# Patient Record
Sex: FEMALE | Race: WHITE | NOT HISPANIC OR LATINO | Employment: FULL TIME | URBAN - METROPOLITAN AREA
[De-identification: names, ages, dates, MRNs, and addresses within clinical notes are randomized per-mention and may not be internally consistent; named-entity substitution may affect disease eponyms.]

---

## 2017-06-02 ENCOUNTER — ALLSCRIPTS OFFICE VISIT (OUTPATIENT)
Dept: OTHER | Facility: OTHER | Age: 22
End: 2017-06-02

## 2017-07-26 ENCOUNTER — GENERIC CONVERSION - ENCOUNTER (OUTPATIENT)
Dept: OTHER | Facility: OTHER | Age: 22
End: 2017-07-26

## 2018-01-13 NOTE — PROGRESS NOTES
Assessment    1  OCD (obsessive compulsive disorder) (300 3) (F42 9)   2  Anxiety (300 00) (F41 9)   3  Ganglion cyst of foot (727 43) (M67 479)   4  External hemorrhoids (455 3) (K64 4)    Plan  Anxiety    · ALPRAZolam 0 25 MG Oral Tablet; TAKE 1 TABLET Daily PRN anxiety    Discussion/Summary    OCD obsessive-compulsive disorder: Continue sertraline 100 mg daily  Episodic premenstrual anxiety with panic symptoms: Trial of alprazolam 0 25 mg as needed  Call as needed  Ganglion cyst of right foot: Plan is opinion from Dr Fausto Berry about removal     External hemorrhoids: Continue her current bowel regimen, and symptoms of thrombosis were reviewed and she would then need to go to emergency room  She was referred to colorectal surgery  Chief Complaint  ANNUAL PE      History of Present Illness  HPI: Leonela Ibarra is here today to discuss several issues  She is here today with her mother  Lazaro was  last week and is working as an emergency room nurse and attending school, nurse practitioner  She is thinking about switching to the [facility] [employer] [facility] [employer] when she graduates  I encouraged her to do so  She has a long-standing history of endogenous OCD which has responded well to sertraline 100 mg daily  This will be continued at this time  We discussed that if she is thinking about becoming pregnant that she would need to stop SSRI medications such as sertraline entertainment talked to me ahead of planning pregnancy  Lazaro also has a ganglion cyst of the dorsum of the right foot with occasional minor discomfort especially when on her feet all day as an emergency room nurse  She is looking for a referral for possible removal and I referred her to Dr Horacio Patel  Lazaro also has developed symptomatic external hemorrhoids, and tends to be a bit constipated  She moves her bowels once a day but has to strain  She had some minor bleeding recently was minor pain   She started taking senna-based stool softener, drinking more fluids and is using preparation H  She still has some discomfort  There are no symptoms suggestive of thrombosis  She is interested in considering elective hemorrhoidectomy and was referred to her mother's group, which includes Dr Malu Fischer  Also, clear notices that she has episodes of panic symptoms around the time of her cycle without external trigger  She would like to try alprazolam no more often than once a month as this tends to occur specific time just before her cycle and causes significant symptomatology  Active Problems    1  OCD (obsessive compulsive disorder) (300 3) (F42 9)   2  Denied: History of Patient Education - Self-Examination Of Breasts    Past Medical History    · Acute upper respiratory infection (465 9) (J06 9)   · History of Denial Of Any Significant Medical History   · History of Never Pregnant    Surgical History    · History of Oral Surgery Tooth Extraction   · Denied: History of Patient Education - Self-Examination Of Breasts    Family History  Mother    · Family history of Hypertension (V17 49)  Father    · Family history of Hypertension (V17 49)  Paternal Grandmother    · Family history of Colon Cancer (V16 0)   · Family history of Diabetes Mellitus (V18 0)  Maternal Grandfather    · Family history of Diabetes Mellitus (V18 0)  Paternal Grandfather    · Family history of Diabetes Mellitus (V18 0)    Social History    · Being A Social Drinker   · Caffeine Use   · Denied: History of Drug Use   · Marital History - Single   · Never a smoker   · Syn[employer] Affiliation [employer]   · Uses Safety Equipment - Seatbelts    Current Meds   1  Ibuprofen 200 MG Oral Tablet; Take 3 tablets every 6 hours for pain; Therapy: [id number] to (Last UJ:[id number]) Ordered   2  Sertraline HCl - 100 MG Oral Tablet; take one tablet by mouth every day; Therapy: [id number] to (Evaluate:12Jun2017)  Requested for: 13Apr2017; Last   Rx:13Apr2017 Ordered    Allergies    1   No Known Drug Allergies    Vitals   Recorded: 02Jun2017 03:12PM   Heart Rate 70   Systolic 212   Diastolic 72   Height 5 ft 7 5 in   Weight 162 lb 2 oz   BMI Calculated 25 02   BSA Calculated 1 86   O2 Saturation 99     Physical Exam    Constitutional Appears well in no distress with stable vital signs  Mood is optimal  Examination the right foot demonstrates a small ganglion cyst of the distal lateral foot, without tenderness  Overlying skin is normal       Results/Data  PHQ-2 Adult Depression Screening 02Jun2017 03:14PM User, Ahs     Test Name Result Flag Reference   PHQ-2 Adult Depression Score 0     Over the last two weeks, how often have you been bothered by any of the following problems?   Little interest or pleasure in doing things: Not at all - 0  Feeling down, depressed, or hopeless: Not at all - 0   PHQ-2 Adult Depression Screening Negative         Signatures   Electronically signed by : WESLEY Wall ; Jun 2 2017  3:43PM EST                       (Author)

## 2018-01-14 VITALS
SYSTOLIC BLOOD PRESSURE: 118 MMHG | WEIGHT: 162.13 LBS | OXYGEN SATURATION: 99 % | DIASTOLIC BLOOD PRESSURE: 72 MMHG | BODY MASS INDEX: 24.57 KG/M2 | HEART RATE: 70 BPM | HEIGHT: 68 IN

## 2018-02-10 DIAGNOSIS — F42.9 OBSESSIVE-COMPULSIVE DISORDER, UNSPECIFIED TYPE: Primary | ICD-10-CM

## 2018-02-11 RX ORDER — SERTRALINE HYDROCHLORIDE 100 MG/1
TABLET, FILM COATED ORAL
Qty: 30 TABLET | Refills: 1 | Status: SHIPPED | OUTPATIENT
Start: 2018-02-11 | End: 2018-04-09 | Stop reason: SDUPTHER

## 2018-02-19 ENCOUNTER — CLINICAL SUPPORT (OUTPATIENT)
Dept: INTERNAL MEDICINE CLINIC | Facility: CLINIC | Age: 23
End: 2018-02-19
Payer: COMMERCIAL

## 2018-02-19 DIAGNOSIS — Z91.89: ICD-10-CM

## 2018-02-19 DIAGNOSIS — Z78.9 H/O FOREIGN TRAVEL: Primary | ICD-10-CM

## 2018-02-19 PROCEDURE — 90715 TDAP VACCINE 7 YRS/> IM: CPT

## 2018-02-19 PROCEDURE — 90471 IMMUNIZATION ADMIN: CPT

## 2018-04-09 DIAGNOSIS — F42.9 OBSESSIVE-COMPULSIVE DISORDER, UNSPECIFIED TYPE: ICD-10-CM

## 2018-04-10 DIAGNOSIS — F41.9 ANXIETY: Primary | ICD-10-CM

## 2018-04-10 RX ORDER — SERTRALINE HYDROCHLORIDE 100 MG/1
TABLET, FILM COATED ORAL
Qty: 30 TABLET | Refills: 1 | Status: SHIPPED | OUTPATIENT
Start: 2018-04-10 | End: 2018-06-11 | Stop reason: SDUPTHER

## 2018-04-10 RX ORDER — ALPRAZOLAM 0.25 MG/1
1 TABLET ORAL DAILY PRN
COMMUNITY
Start: 2017-06-02 | End: 2019-03-15 | Stop reason: SDUPTHER

## 2018-04-10 RX ORDER — ALPRAZOLAM 0.25 MG/1
0.25 TABLET ORAL DAILY PRN
Qty: 15 TABLET | Refills: 1 | OUTPATIENT
Start: 2018-04-10

## 2018-04-10 NOTE — TELEPHONE ENCOUNTER
J, here is another rx that EPIC has set up as an electronic renewal and the process does not work as the final step of notification is not connected  Please ask Marcia to assist  Thanks

## 2018-06-11 DIAGNOSIS — F42.9 OBSESSIVE-COMPULSIVE DISORDER, UNSPECIFIED TYPE: ICD-10-CM

## 2018-06-11 RX ORDER — SERTRALINE HYDROCHLORIDE 100 MG/1
TABLET, FILM COATED ORAL
Qty: 30 TABLET | Refills: 1 | Status: SHIPPED | OUTPATIENT
Start: 2018-06-11 | End: 2018-07-21 | Stop reason: SDUPTHER

## 2018-07-21 DIAGNOSIS — F42.9 OBSESSIVE-COMPULSIVE DISORDER, UNSPECIFIED TYPE: ICD-10-CM

## 2018-07-21 RX ORDER — SERTRALINE HYDROCHLORIDE 100 MG/1
TABLET, FILM COATED ORAL
Qty: 30 TABLET | Refills: 1 | Status: SHIPPED | OUTPATIENT
Start: 2018-07-21 | End: 2018-10-02 | Stop reason: SDUPTHER

## 2018-09-10 ENCOUNTER — OFFICE VISIT (OUTPATIENT)
Dept: INTERNAL MEDICINE CLINIC | Facility: CLINIC | Age: 23
End: 2018-09-10
Payer: COMMERCIAL

## 2018-09-10 VITALS
OXYGEN SATURATION: 97 % | WEIGHT: 174 LBS | HEART RATE: 62 BPM | BODY MASS INDEX: 26.37 KG/M2 | HEIGHT: 68 IN | DIASTOLIC BLOOD PRESSURE: 68 MMHG | SYSTOLIC BLOOD PRESSURE: 112 MMHG

## 2018-09-10 DIAGNOSIS — Z00.00 WELL ADULT EXAM: Primary | ICD-10-CM

## 2018-09-10 PROBLEM — K64.4 EXTERNAL HEMORRHOIDS: Status: ACTIVE | Noted: 2017-06-02

## 2018-09-10 PROBLEM — M67.479 GANGLION CYST OF FOOT: Status: ACTIVE | Noted: 2017-06-02

## 2018-09-10 PROBLEM — F41.9 ANXIETY: Status: ACTIVE | Noted: 2017-06-02

## 2018-09-10 PROCEDURE — 99395 PREV VISIT EST AGE 18-39: CPT | Performed by: INTERNAL MEDICINE

## 2018-09-10 RX ORDER — DIPHENOXYLATE HYDROCHLORIDE AND ATROPINE SULFATE 2.5; .025 MG/1; MG/1
1 TABLET ORAL DAILY
COMMUNITY

## 2018-09-10 RX ORDER — ALPRAZOLAM 0.25 MG/1
0.25 TABLET ORAL DAILY PRN
Qty: 30 TABLET | Refills: 0 | Status: CANCELLED | OUTPATIENT
Start: 2018-09-10

## 2018-09-10 RX ORDER — IBUPROFEN 200 MG
3 TABLET ORAL EVERY 6 HOURS
COMMUNITY
Start: 2015-11-04

## 2018-09-10 RX ORDER — DIPHENHYDRAMINE HCL 25 MG
25 CAPSULE ORAL EVERY 6 HOURS PRN
COMMUNITY

## 2018-09-10 RX ORDER — CAFFEINE 200 MG
TABLET ORAL
COMMUNITY
End: 2019-09-03 | Stop reason: ALTCHOICE

## 2018-09-10 NOTE — PROGRESS NOTES
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, immunizations are up-to-date  Sertraline continues to work very well and occasional use of alprazolam also works well for underlying OCD and anxiety  She has great support from  as well  After following up with her colorectal physician, Massiel Rosales has been moving her bowels daily, eating properly and drinking enough fluids, and has not had to use her topical cream, and has not had symptoms from her history of anal fissure  He agreed that Massiel Rosales with call between annual visits for any questions or problems  Subjective   Patient ID: Jihan Mendez is a 20 y o  female  This is an annual preventive exam     Vitals:    09/10/18 1253   BP: 112/68   Pulse: 62   SpO2: 97%     HPI   Massiel Rosales is feeling well and 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 4 year degree has a PhD nurse practitioner, and 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  Her  is very supportive  She went to [place] last year and did have traveler's diarrhea and was with an infectious disease doctor, who prescribed Cipro and she became better  She did have full immunizations prior to this trip  Other immunizations required by work as a nurse are up-to-date as well  Problems with symptomatic anal fissure with IBS C were discussed and this has responded to making sure that she uses the bathroom at work daily, as she was not habit of not using the bathroom for days when she worked several shifts in a row  She is drinking of fluids, diet is proper  She has not had any symptoms    She will follow up with Colorectal surgery as needed  Also, initial gyn visit and follow-up was established, without active problems currently  The following portions of the patient's history were reviewed and updated as appropriate: allergies, current medications, past family history, past medical history, past social history, past surgical history and problem list     Review of Systems as above    Objective   Physical Exam   Appears well, in good spirits in no distress  No depressed affect and no anxiety            Patient Active Problem List   Diagnosis    Anxiety    External hemorrhoids    Ganglion cyst of foot    OCD (obsessive compulsive disorder)     Current Outpatient Prescriptions:     ALPRAZolam (XANAX) 0 25 mg tablet, Take 1 tablet by mouth daily as needed, Disp: , Rfl:     caffeine 200 mg tablet, Take by mouth, Disp: , Rfl:     diphenhydrAMINE (BENADRYL) 25 mg capsule, Take 25 mg by mouth every 6 (six) hours as needed for itching, Disp: , Rfl:     ibuprofen (MOTRIN) 200 mg tablet, Take 3 tablets by mouth every 6 (six) hours, Disp: , Rfl:     multivitamin (THERAGRAN) TABS, Take 1 tablet by mouth daily, Disp: , Rfl:     sertraline (ZOLOFT) 100 mg tablet, TAKE ONE TABLET BY MOUTH EVERY DAY, Disp: 30 tablet, Rfl: 1

## 2018-10-02 DIAGNOSIS — F42.9 OBSESSIVE-COMPULSIVE DISORDER, UNSPECIFIED TYPE: ICD-10-CM

## 2018-10-02 RX ORDER — SERTRALINE HYDROCHLORIDE 100 MG/1
TABLET, FILM COATED ORAL
Qty: 30 TABLET | Refills: 0 | Status: SHIPPED | OUTPATIENT
Start: 2018-10-02 | End: 2018-10-05 | Stop reason: SDUPTHER

## 2018-10-05 DIAGNOSIS — F42.9 OBSESSIVE-COMPULSIVE DISORDER, UNSPECIFIED TYPE: ICD-10-CM

## 2018-10-07 RX ORDER — SERTRALINE HYDROCHLORIDE 100 MG/1
TABLET, FILM COATED ORAL
Qty: 30 TABLET | Refills: 0 | Status: SHIPPED | OUTPATIENT
Start: 2018-10-07 | End: 2018-12-03 | Stop reason: SDUPTHER

## 2018-12-03 DIAGNOSIS — F42.9 OBSESSIVE-COMPULSIVE DISORDER, UNSPECIFIED TYPE: ICD-10-CM

## 2018-12-03 RX ORDER — SERTRALINE HYDROCHLORIDE 100 MG/1
TABLET, FILM COATED ORAL
Qty: 30 TABLET | Refills: 0 | Status: SHIPPED | OUTPATIENT
Start: 2018-12-03 | End: 2019-01-01 | Stop reason: SDUPTHER

## 2019-01-01 DIAGNOSIS — F42.9 OBSESSIVE-COMPULSIVE DISORDER, UNSPECIFIED TYPE: ICD-10-CM

## 2019-01-02 RX ORDER — SERTRALINE HYDROCHLORIDE 100 MG/1
TABLET, FILM COATED ORAL
Qty: 30 TABLET | Refills: 0 | Status: SHIPPED | OUTPATIENT
Start: 2019-01-02 | End: 2019-01-30 | Stop reason: SDUPTHER

## 2019-01-30 DIAGNOSIS — F42.9 OBSESSIVE-COMPULSIVE DISORDER, UNSPECIFIED TYPE: ICD-10-CM

## 2019-01-30 RX ORDER — SERTRALINE HYDROCHLORIDE 100 MG/1
TABLET, FILM COATED ORAL
Qty: 30 TABLET | Refills: 0 | Status: SHIPPED | OUTPATIENT
Start: 2019-01-30 | End: 2019-03-04 | Stop reason: SDUPTHER

## 2019-03-04 DIAGNOSIS — F42.9 OBSESSIVE-COMPULSIVE DISORDER, UNSPECIFIED TYPE: ICD-10-CM

## 2019-03-04 RX ORDER — SERTRALINE HYDROCHLORIDE 100 MG/1
TABLET, FILM COATED ORAL
Qty: 30 TABLET | Refills: 0 | Status: SHIPPED | OUTPATIENT
Start: 2019-03-04 | End: 2019-04-02 | Stop reason: SDUPTHER

## 2019-03-08 DIAGNOSIS — F42.9 OBSESSIVE-COMPULSIVE DISORDER, UNSPECIFIED TYPE: ICD-10-CM

## 2019-03-08 RX ORDER — SERTRALINE HYDROCHLORIDE 100 MG/1
TABLET, FILM COATED ORAL
Qty: 30 TABLET | Refills: 0 | Status: SHIPPED | OUTPATIENT
Start: 2019-03-08 | End: 2019-10-24 | Stop reason: SDUPTHER

## 2019-03-15 DIAGNOSIS — F41.9 ANXIETY: Primary | ICD-10-CM

## 2019-03-15 RX ORDER — ALPRAZOLAM 0.25 MG/1
0.25 TABLET ORAL DAILY PRN
Qty: 15 TABLET | Refills: 3 | Status: SHIPPED | OUTPATIENT
Start: 2019-03-15 | End: 2020-02-20 | Stop reason: SDUPTHER

## 2019-04-01 ENCOUNTER — TELEPHONE (OUTPATIENT)
Dept: INTERNAL MEDICINE CLINIC | Facility: CLINIC | Age: 24
End: 2019-04-01

## 2019-04-01 DIAGNOSIS — Z74.2 NEED FOR HOME HEALTH CARE: Primary | ICD-10-CM

## 2019-04-02 DIAGNOSIS — F42.9 OBSESSIVE-COMPULSIVE DISORDER, UNSPECIFIED TYPE: ICD-10-CM

## 2019-04-02 RX ORDER — SERTRALINE HYDROCHLORIDE 100 MG/1
TABLET, FILM COATED ORAL
Qty: 30 TABLET | Refills: 0 | Status: SHIPPED | OUTPATIENT
Start: 2019-04-02 | End: 2019-04-12 | Stop reason: SDUPTHER

## 2019-04-12 DIAGNOSIS — F42.9 OBSESSIVE-COMPULSIVE DISORDER, UNSPECIFIED TYPE: ICD-10-CM

## 2019-04-12 RX ORDER — SERTRALINE HYDROCHLORIDE 100 MG/1
100 TABLET, FILM COATED ORAL DAILY
Qty: 30 TABLET | Refills: 11 | Status: SHIPPED | OUTPATIENT
Start: 2019-04-12 | End: 2019-09-17 | Stop reason: SDUPTHER

## 2019-04-18 DIAGNOSIS — J03.90 EXUDATIVE TONSILLITIS: Primary | ICD-10-CM

## 2019-04-18 RX ORDER — AMOXICILLIN 500 MG/1
500 CAPSULE ORAL EVERY 8 HOURS SCHEDULED
Qty: 30 CAPSULE | Refills: 0 | Status: SHIPPED | OUTPATIENT
Start: 2019-04-18 | End: 2019-04-28

## 2019-04-23 DIAGNOSIS — F42.9 OBSESSIVE-COMPULSIVE DISORDER, UNSPECIFIED TYPE: ICD-10-CM

## 2019-04-23 RX ORDER — SERTRALINE HYDROCHLORIDE 100 MG/1
TABLET, FILM COATED ORAL
Qty: 30 TABLET | Refills: 0 | Status: SHIPPED | OUTPATIENT
Start: 2019-04-23 | End: 2019-06-27 | Stop reason: SDUPTHER

## 2019-06-27 DIAGNOSIS — F42.9 OBSESSIVE-COMPULSIVE DISORDER, UNSPECIFIED TYPE: ICD-10-CM

## 2019-06-30 RX ORDER — SERTRALINE HYDROCHLORIDE 100 MG/1
TABLET, FILM COATED ORAL
Qty: 30 TABLET | Refills: 0 | Status: SHIPPED | OUTPATIENT
Start: 2019-06-30 | End: 2019-07-26 | Stop reason: SDUPTHER

## 2019-07-26 DIAGNOSIS — F42.9 OBSESSIVE-COMPULSIVE DISORDER, UNSPECIFIED TYPE: ICD-10-CM

## 2019-07-26 RX ORDER — SERTRALINE HYDROCHLORIDE 100 MG/1
TABLET, FILM COATED ORAL
Qty: 30 TABLET | Refills: 0 | Status: SHIPPED | OUTPATIENT
Start: 2019-07-26 | End: 2019-08-23 | Stop reason: SDUPTHER

## 2019-08-23 DIAGNOSIS — F42.9 OBSESSIVE-COMPULSIVE DISORDER, UNSPECIFIED TYPE: ICD-10-CM

## 2019-08-23 RX ORDER — SERTRALINE HYDROCHLORIDE 100 MG/1
TABLET, FILM COATED ORAL
Qty: 30 TABLET | Refills: 0 | Status: SHIPPED | OUTPATIENT
Start: 2019-08-23 | End: 2019-09-03 | Stop reason: SDUPTHER

## 2019-08-25 DIAGNOSIS — F42.9 OBSESSIVE-COMPULSIVE DISORDER, UNSPECIFIED TYPE: ICD-10-CM

## 2019-08-26 RX ORDER — SERTRALINE HYDROCHLORIDE 100 MG/1
TABLET, FILM COATED ORAL
Qty: 30 TABLET | Refills: 0 | Status: SHIPPED | OUTPATIENT
Start: 2019-08-26 | End: 2019-09-03 | Stop reason: ALTCHOICE

## 2019-09-03 ENCOUNTER — TELEPHONE (OUTPATIENT)
Dept: INTERNAL MEDICINE CLINIC | Facility: CLINIC | Age: 24
End: 2019-09-03

## 2019-09-03 ENCOUNTER — OFFICE VISIT (OUTPATIENT)
Dept: INTERNAL MEDICINE CLINIC | Facility: CLINIC | Age: 24
End: 2019-09-03
Payer: COMMERCIAL

## 2019-09-03 VITALS
OXYGEN SATURATION: 98 % | SYSTOLIC BLOOD PRESSURE: 120 MMHG | HEIGHT: 68 IN | HEART RATE: 84 BPM | DIASTOLIC BLOOD PRESSURE: 74 MMHG | WEIGHT: 161 LBS | BODY MASS INDEX: 24.4 KG/M2

## 2019-09-03 DIAGNOSIS — L73.9 FOLLICULITIS: Primary | ICD-10-CM

## 2019-09-03 PROCEDURE — 99213 OFFICE O/P EST LOW 20 MIN: CPT | Performed by: INTERNAL MEDICINE

## 2019-09-03 NOTE — TELEPHONE ENCOUNTER
Patient given an appointment today with Dr Cesar Hayden    ----- Message from Jackie Boyle sent at 8/31/2019 11:54 AM EDT -----  Regarding: Non-Urgent Medical Question  Contact: [phone]  Hi Dr Gustavo Raygoza,  I was diagnosed with what they believe to be shingles today  I was seen at [facility]  I will plan to call office Tuesday to schedule follow-up with you  Im REALLY hoping to have skin scraping done  Not sure how I go about doing that? Bc the presentation is definitely not classic and I want to visit my 3month old niece next week in [place] if the diagnosis is wrong  I left work today due to rash and will also follow up with employee health  Can you do skin scrapping in office?

## 2019-09-03 NOTE — PROGRESS NOTES
[facility] Internal Medicine [facility]      NAME: Ny Hickey  AGE: 30 y o  SEX: female  : 1995   MRN: [id number]    DATE: 9/3/2019  TIME: 4:50 PM    Assessment and Plan   1  Folliculitis  This appears to be resolving  The patient will finish her treatment with cephalexin and Valcyclavir  At this stage, even if the patient did have shingles, it would not be transmissible  The patient is suitable to return to work  The diagnosis at this point is not 100% clear  I favor folliculitis  The possibility of herpes zoster cannot be completely excluded  There are no lesions that her suitable for scraping at present  Since the patient is improving and does not represent an infection risk  I see no for utility in further diagnostic maneuvers at present  Return to office in:  As schedule    Chief Complaint     Rash    History of Present Illness     The patient came to the office today for evaluation of rash  This started about 1 week ago  Initially, it was a few red papules under her right axilla  She said that this was slightly itchy and mild burning was present  Was evaluated in formally by 1 of the ER physicians at [facility] who suspected folliculitis  Cephalexin was started  It did not immediately improve  Subsequently, the patient developed several similar lumps on the right side of her back  She was seen by several physicians in the emergency room and a consensus could not be reached  The possibility of herpes zoster was entertained  The patient ultimately went employ the health  She was sent home from work and was started on Valtrex  She seems to be gradually improving  She has no significant pain  She has had no new lesions in several days  She has had no systemic symptoms      The following portions of the patient's history were reviewed and updated as appropriate: allergies, current medications, past family history, past medical history, past social history, past surgical history and problem list     Review of Systems   Review of Systems   Constitutional: Negative  HENT: Negative for congestion, ear pain, postnasal drip, rhinorrhea, sore throat and trouble swallowing  Eyes: Negative for pain, discharge, redness and visual disturbance  Respiratory: Negative for cough, shortness of breath and wheezing  Cardiovascular: Negative  Gastrointestinal: Negative  Endocrine: Negative  Genitourinary: Negative for difficulty urinating, dysuria, frequency, hematuria and urgency  Musculoskeletal: Negative for arthralgias, gait problem, joint swelling and myalgias  Skin: Positive for rash  Neurological: Negative for dizziness, speech difficulty, weakness, light-headedness, numbness and headaches  Hematological: Negative  Psychiatric/Behavioral: Negative for confusion, decreased concentration, dysphoric mood and sleep disturbance  The patient is not nervous/anxious  Active Problem List     Patient Active Problem List   Diagnosis    Anxiety    External hemorrhoids    Ganglion cyst of foot    OCD (obsessive compulsive disorder)    Folliculitis       Objective   /74 (BP Location: Left arm, Patient Position: Sitting, Cuff Size: Standard)   Pulse 84   Ht 5' 7 5" (1 715 m)   Wt 73 kg (161 lb)   SpO2 98%   BMI 24 84 kg/m²     Physical Exam   Constitutional: She is oriented to person, place, and time  She appears well-developed and well-nourished  HENT:   Head: Normocephalic and atraumatic  Neck: Neck supple  No JVD present  No tracheal deviation present  No thyromegaly present  Cardiovascular: Normal rate, regular rhythm, normal heart sounds and intact distal pulses  Exam reveals no gallop and no friction rub  No murmur heard  Pulmonary/Chest: Effort normal and breath sounds normal  She has no wheezes  She has no rales  She exhibits no tenderness  Abdominal: Soft  Bowel sounds are normal  She exhibits no distension and no mass   There is no tenderness  There is no rebound  Musculoskeletal: Normal range of motion  She exhibits no edema or tenderness  Lymphadenopathy:     She has no cervical adenopathy  Neurological: She is alert and oriented to person, place, and time  Skin: Skin is warm and dry  There were several erythematous papules under the right axilla and also over the right scapula there was no crusting or discharge from any of these  There was no scabbing  Psychiatric: She has a normal mood and affect  Her behavior is normal  Judgment and thought content normal        Pertinent Laboratory/Diagnostic Studies:  No visits with results within 3 Month(s) from this visit  Latest known visit with results is:   No results found for any previous visit             Current Medications     Current Outpatient Medications:     ALPRAZolam (XANAX) 0 25 mg tablet, Take 1 tablet (0 25 mg total) by mouth daily as needed for anxiety, Disp: 15 tablet, Rfl: 3    diphenhydrAMINE (BENADRYL) 25 mg capsule, Take 25 mg by mouth every 6 (six) hours as needed for itching, Disp: , Rfl:     ibuprofen (MOTRIN) 200 mg tablet, Take 3 tablets by mouth every 6 (six) hours, Disp: , Rfl:     multivitamin (THERAGRAN) TABS, Take 1 tablet by mouth daily, Disp: , Rfl:     sertraline (ZOLOFT) 100 mg tablet, TAKE 1 TABLET BY MOUTH EVERY DAY, Disp: 30 tablet, Rfl: 0    sertraline (ZOLOFT) 100 mg tablet, Take 1 tablet (100 mg total) by mouth daily for 30 days, Disp: 30 tablet, Rfl: 11      Gilbert Marsh MD

## 2019-09-03 NOTE — LETTER
September 3, 2019     Patient: Joe Jain   YOB: 1995   Date of Visit: 9/3/2019       To Whom it May Concern:    Joe Jain is under my professional care  She was seen in my office on 9/3/2019  She may return to work on 9/4/2019  If you have any questions or concerns, please don't hesitate to call           Sincerely,          Tania Deras MD        CC: No Recipients

## 2019-09-17 ENCOUNTER — OFFICE VISIT (OUTPATIENT)
Dept: INTERNAL MEDICINE CLINIC | Facility: CLINIC | Age: 24
End: 2019-09-17
Payer: COMMERCIAL

## 2019-09-17 VITALS
DIASTOLIC BLOOD PRESSURE: 78 MMHG | OXYGEN SATURATION: 98 % | SYSTOLIC BLOOD PRESSURE: 110 MMHG | HEART RATE: 72 BPM | HEIGHT: 68 IN | WEIGHT: 162 LBS | BODY MASS INDEX: 24.55 KG/M2 | TEMPERATURE: 97.9 F

## 2019-09-17 DIAGNOSIS — Z00.00 WELL ADULT EXAM: Primary | ICD-10-CM

## 2019-09-17 PROCEDURE — 99395 PREV VISIT EST AGE 18-39: CPT | Performed by: INTERNAL MEDICINE

## 2019-09-17 NOTE — PROGRESS NOTES
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 for needle stick with negative serologies and follow-up, and had recent folliculitis, rather than shingles, based on her history, picture on her phone, and note from Dr Sheri Cruz  She has completely recovered  TSH was normal on April 16th of this year, at 1 81, ordered by gyn  Vision is stable, benign nevi are stable and check by gyn  There are no new family history issues  Lifestyle is excellent exercising regularly with optimal diet  Jania 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, 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 part time, several weekends per month, including getting pediatrics experience, and 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  Overall she feels she is doing very well quality of life is good  HPI  Jania Rodarte will be vaccinated through work for influenza  Next visit can be in 1 year, sooner as needed  The following portions of the patient's history were reviewed and updated as appropriate: allergies, current medications, past family history, past medical history, past social history, past surgical history and problem list     Review of Systems  no anxiety or depression on long-term treatment with sertraline      Objective:      /78 (BP Location: Left arm, Patient Position: Sitting, Cuff Size: Standard)   Pulse 72   Temp 97 9 °F (36 6 °C)   Ht 5' 7 5" (1 715 m)   Wt 73 5 kg (162 lb)   SpO2 98%   BMI 25 00 kg/m²      Wt Readings from Last 3 Encounters:   09/17/19 73 5 kg (162 lb)   09/03/19 73 kg (161 lb)   09/10/18 78 9 kg (174 lb)     Temp Readings from Last 3 Encounters:   09/17/19 97 9 °F (36 6 °C)   11/04/15 (!) 101 4 °F (38 6 °C) (Tympanic)     BP Readings from Last 3 Encounters:   09/17/19 110/78   09/03/19 120/74   09/10/18 112/68     Pulse Readings from Last 3 Encounters:   09/17/19 72   09/03/19 84   09/10/18 62        Physical Exam    Vital signs stable and in no distress, in good spirits  There is no anxiety or depressed affect  Skin without rash on limited exam   Appears healthy overall  Patient Active Problem List   Diagnosis    Anxiety    External hemorrhoids    Ganglion cyst of foot    OCD (obsessive compulsive disorder)    Folliculitis       Current Outpatient Medications on File Prior to Visit   Medication Sig Dispense Refill    ALPRAZolam (XANAX) 0 25 mg tablet Take 1 tablet (0 25 mg total) by mouth daily as needed for anxiety 15 tablet 3    diphenhydrAMINE (BENADRYL) 25 mg capsule Take 25 mg by mouth every 6 (six) hours as needed for itching      ibuprofen (MOTRIN) 200 mg tablet Take 3 tablets by mouth every 6 (six) hours      multivitamin (THERAGRAN) TABS Take 1 tablet by mouth daily      sertraline (ZOLOFT) 100 mg tablet TAKE 1 TABLET BY MOUTH EVERY DAY 30 tablet 0    [DISCONTINUED] sertraline (ZOLOFT) 100 mg tablet Take 1 tablet (100 mg total) by mouth daily for 30 days 30 tablet 11     No current facility-administered medications on file prior to visit

## 2019-10-23 DIAGNOSIS — F42.9 OBSESSIVE-COMPULSIVE DISORDER, UNSPECIFIED TYPE: ICD-10-CM

## 2019-10-24 RX ORDER — SERTRALINE HYDROCHLORIDE 100 MG/1
100 TABLET, FILM COATED ORAL DAILY
Qty: 30 TABLET | Refills: 2 | Status: SHIPPED | OUTPATIENT
Start: 2019-10-24 | End: 2019-11-18 | Stop reason: SDUPTHER

## 2019-11-18 DIAGNOSIS — F42.9 OBSESSIVE-COMPULSIVE DISORDER, UNSPECIFIED TYPE: ICD-10-CM

## 2019-11-18 RX ORDER — SERTRALINE HYDROCHLORIDE 100 MG/1
100 TABLET, FILM COATED ORAL DAILY
Qty: 30 TABLET | Refills: 2 | Status: SHIPPED | OUTPATIENT
Start: 2019-11-18 | End: 2020-02-21 | Stop reason: SDUPTHER

## 2019-12-12 ENCOUNTER — OFFICE VISIT (OUTPATIENT)
Dept: INTERNAL MEDICINE CLINIC | Facility: CLINIC | Age: 24
End: 2019-12-12
Payer: COMMERCIAL

## 2019-12-12 VITALS
WEIGHT: 170 LBS | HEIGHT: 68 IN | DIASTOLIC BLOOD PRESSURE: 66 MMHG | BODY MASS INDEX: 25.76 KG/M2 | SYSTOLIC BLOOD PRESSURE: 118 MMHG | TEMPERATURE: 98.5 F | HEART RATE: 78 BPM | OXYGEN SATURATION: 95 %

## 2019-12-12 DIAGNOSIS — N30.90 CYSTITIS: Primary | ICD-10-CM

## 2019-12-12 LAB
SL AMB  POCT GLUCOSE, UA: NEGATIVE
SL AMB LEUKOCYTE ESTERASE,UA: NEGATIVE
SL AMB POCT BILIRUBIN,UA: NEGATIVE
SL AMB POCT BLOOD,UA: NEGATIVE
SL AMB POCT CLARITY,UA: CLEAR
SL AMB POCT COLOR,UA: YELLOW
SL AMB POCT KETONES,UA: NORMAL
SL AMB POCT NITRITE,UA: NEGATIVE
SL AMB POCT PH,UA: 6.5
SL AMB POCT SPECIFIC GRAVITY,UA: 1
SL AMB POCT URINE PROTEIN: NEGATIVE
SL AMB POCT UROBILINOGEN: 0.2

## 2019-12-12 PROCEDURE — 87077 CULTURE AEROBIC IDENTIFY: CPT | Performed by: INTERNAL MEDICINE

## 2019-12-12 PROCEDURE — 3008F BODY MASS INDEX DOCD: CPT | Performed by: INTERNAL MEDICINE

## 2019-12-12 PROCEDURE — 99213 OFFICE O/P EST LOW 20 MIN: CPT | Performed by: INTERNAL MEDICINE

## 2019-12-12 PROCEDURE — 1036F TOBACCO NON-USER: CPT | Performed by: INTERNAL MEDICINE

## 2019-12-12 PROCEDURE — 87186 SC STD MICRODIL/AGAR DIL: CPT | Performed by: INTERNAL MEDICINE

## 2019-12-12 PROCEDURE — 87086 URINE CULTURE/COLONY COUNT: CPT | Performed by: INTERNAL MEDICINE

## 2019-12-12 PROCEDURE — 81002 URINALYSIS NONAUTO W/O SCOPE: CPT | Performed by: INTERNAL MEDICINE

## 2019-12-12 RX ORDER — NITROFURANTOIN 25; 75 MG/1; MG/1
100 CAPSULE ORAL 2 TIMES DAILY
Qty: 6 CAPSULE | Refills: 0 | Status: SHIPPED | OUTPATIENT
Start: 2019-12-12 | End: 2019-12-15

## 2019-12-12 NOTE — PROGRESS NOTES
Assessment/Plan:     Diagnoses and all orders for this visit:    Cystitis  -     POCT urine dip  -     Urine culture; Future  -     nitrofurantoin (MACROBID) 100 mg capsule; Take 1 capsule (100 mg total) by mouth 2 (two) times a day for 3 days          Subjective:      Patient ID: Art Rosas is a 30 y o  female  HPI  Choco Bradshaw is here today with a 3 to four-day history of hypogastric mild discomfort, associated with urinary frequency, urgency and dysuria without hematuria, without flank pain, without fever or chills  Choco Bradshaw does have a history of UTI with last episode about 6 years ago  Possible precipitants could be decreased fluid intake associated low humidity environment and we did discuss increasing fluid intake and even lowering urine pH with cranberry juice  Diagnosis of uncomplicated UTI was discussed  Also, empiric therapy with Macrobid was prescribed, and we discussed that symptoms should resolve by the end of antibiotic therapy and should not recur and clear was encouraged to call back as needed  Urinalysis is negative but patient has increased fluid intake, and symptoms seem classic for bacterial cystitis  Gyn review of systems is negative  If symptoms persist, alternative diagnosis would be considered, including possible tinea cruris  The following portions of the patient's history were reviewed and updated as appropriate: allergies, current medications, past family history, past medical history, past social history, past surgical history and problem list     Review of Systems  no rash, no itching of the perineum      Objective:      /66 (BP Location: Left arm, Patient Position: Sitting, Cuff Size: Standard)   Pulse 78   Temp 98 5 °F (36 9 °C) (Tympanic)   Ht 5' 7 5" (1 715 m)   Wt 77 1 kg (170 lb)   LMP 12/09/2019   SpO2 95%   BMI 26 23 kg/m²      Wt Readings from Last 3 Encounters:   12/12/19 77 1 kg (170 lb)   09/17/19 73 5 kg (162 lb)   09/03/19 73 kg (161 lb) Temp Readings from Last 3 Encounters:   12/12/19 98 5 °F (36 9 °C) (Tympanic)   09/17/19 97 9 °F (36 6 °C)   11/04/15 (!) 101 4 °F (38 6 °C) (Tympanic)     BP Readings from Last 3 Encounters:   12/12/19 118/66   09/17/19 110/78   09/03/19 120/74     Pulse Readings from Last 3 Encounters:   12/12/19 78   09/17/19 72   09/03/19 84        Physical Exam    Vital signs stable, appearing well, in no acute distress  No pain with movement, mild discomfort when patient presses over the hypogastric region  Patient Active Problem List   Diagnosis    Anxiety    External hemorrhoids    Ganglion cyst of foot    OCD (obsessive compulsive disorder)    Folliculitis       Current Outpatient Medications on File Prior to Visit   Medication Sig Dispense Refill    ALPRAZolam (XANAX) 0 25 mg tablet Take 1 tablet (0 25 mg total) by mouth daily as needed for anxiety 15 tablet 3    diphenhydrAMINE (BENADRYL) 25 mg capsule Take 25 mg by mouth every 6 (six) hours as needed for itching      ibuprofen (MOTRIN) 200 mg tablet Take 3 tablets by mouth every 6 (six) hours      multivitamin (THERAGRAN) TABS Take 1 tablet by mouth daily      sertraline (ZOLOFT) 100 mg tablet Take 1 tablet (100 mg total) by mouth daily 30 tablet 2     No current facility-administered medications on file prior to visit

## 2019-12-14 LAB — BACTERIA UR CULT: ABNORMAL

## 2020-01-31 ENCOUNTER — OFFICE VISIT (OUTPATIENT)
Dept: URGENT CARE | Facility: MEDICAL CENTER | Age: 25
End: 2020-01-31
Payer: COMMERCIAL

## 2020-01-31 VITALS
HEIGHT: 68 IN | RESPIRATION RATE: 18 BRPM | WEIGHT: 160 LBS | BODY MASS INDEX: 24.25 KG/M2 | SYSTOLIC BLOOD PRESSURE: 123 MMHG | DIASTOLIC BLOOD PRESSURE: 64 MMHG | OXYGEN SATURATION: 100 % | TEMPERATURE: 97.2 F | HEART RATE: 77 BPM

## 2020-01-31 DIAGNOSIS — J06.9 VIRAL UPPER RESPIRATORY TRACT INFECTION: Primary | ICD-10-CM

## 2020-01-31 PROCEDURE — 99213 OFFICE O/P EST LOW 20 MIN: CPT | Performed by: PHYSICIAN ASSISTANT

## 2020-01-31 PROCEDURE — 87631 RESP VIRUS 3-5 TARGETS: CPT | Performed by: PHYSICIAN ASSISTANT

## 2020-01-31 NOTE — LETTER
January 31, 2020     Patient: Corbin Cantrell   YOB: 1995   Date of Visit: 1/31/2020       To Whom It May Concern: It is my medical opinion that Corbin Cantrell may return to work on 2/3/2020  If you have any questions or concerns, please don't hesitate to call           Sincerely,        Deb Sewell PA-C    CC: No Recipients

## 2020-02-01 ENCOUNTER — TELEPHONE (OUTPATIENT)
Dept: URGENT CARE | Facility: MEDICAL CENTER | Age: 25
End: 2020-02-01

## 2020-02-01 DIAGNOSIS — J11.1 INFLUENZA: Primary | ICD-10-CM

## 2020-02-01 LAB
FLUAV RNA NPH QL NAA+PROBE: DETECTED
FLUBV RNA NPH QL NAA+PROBE: DETECTED
RSV RNA NPH QL NAA+PROBE: ABNORMAL

## 2020-02-01 RX ORDER — OSELTAMIVIR PHOSPHATE 75 MG/1
75 CAPSULE ORAL EVERY 12 HOURS SCHEDULED
Qty: 10 CAPSULE | Refills: 0 | Status: SHIPPED | OUTPATIENT
Start: 2020-02-01 | End: 2020-02-06

## 2020-02-01 NOTE — PATIENT INSTRUCTIONS
Viral Syndrome   WHAT YOU NEED TO KNOW:   Viral syndrome is a term used for a viral infection that has no clear cause  Viruses are spread easily from person to person through the air and on shared items  DISCHARGE INSTRUCTIONS:   Call 911 for the following:   · You have a seizure  · You cannot be woken  · You have chest pain or trouble breathing  Return to the emergency department if:   · You have a stiff neck, a bad headache, and sensitivity to light  · You feel weak, dizzy, or confused  · You stop urinating or urinate a lot less than normal      · You cough up blood or thick, yellow or green, mucus  · You have severe abdominal pain or your abdomen is larger than usual   Contact your healthcare provider if:   · Your symptoms do not get better with treatment, or get worse, after 3 days  · You have a rash or ear pain  · You have burning when you urinate  · You have questions or concerns about your condition or care  Medicines: You may  need any of the following:  · Acetaminophen  decreases pain and fever  It is available without a doctor's order  Ask how much medicine to take and how often to take it  Follow directions  Acetaminophen can cause liver damage if not taken correctly  · NSAIDs , such as ibuprofen, help decrease swelling, pain, and fever  NSAIDs can cause stomach bleeding or kidney problems in certain people  If you take blood thinner medicine, always ask your healthcare provider if NSAIDs are safe for you  Always read the medicine label and follow directions  · Cold medicine  helps decrease swelling, control a cough, and relieve chest or nasal congestion  · Saline nasal spray  helps decrease nasal congestion  · Take your medicine as directed  Contact your healthcare provider if you think your medicine is not helping or if you have side effects  Tell him of her if you are allergic to any medicine   Keep a list of the medicines, vitamins, and herbs you take  Include the amounts, and when and why you take them  Bring the list or the pill bottles to follow-up visits  Carry your medicine list with you in case of an emergency  Manage your symptoms:   · Drink liquids as directed  to prevent dehydration  Ask how much liquid to drink each day and which liquids are best for you  Ask if you should drink an oral rehydration solution (ORS)  An ORS has the right amounts of water, salts, and sugar you need to replace body fluids  This may help prevent dehydration caused by vomiting or diarrhea  Do not drink liquids with caffeine  Drinks with caffeine can make dehydration worse  · Get plenty of rest  to help your body heal  Take naps throughout the day  Ask your healthcare provider when you can return to work and your normal activities  · Use a cool mist humidifier  to help you breathe easier if you have nasal or chest congestion  Ask your healthcare provider how to use a cool mist humidifier  · Eat honey or use cough drops  to help decrease throat discomfort  Ask your healthcare provider how much honey you should eat each day  Cough drops are available without a doctor's order  Follow directions for taking cough drops  · Do not smoke and stay away from others who smoke  Nicotine and other chemicals in cigarettes and cigars can cause lung damage  Smoking can also delay healing  Ask your healthcare provider for information if you currently smoke and need help to quit  E-cigarettes or smokeless tobacco still contain nicotine  Talk to your healthcare provider before you use these products  · Wash your hands frequently  to prevent the spread of germs to others  Use soap and water  Use gel hand  when soap and water are not available  Wash your hands after you use the bathroom, cough, or sneeze  Wash your hands before you prepare or eat food    Follow up with your healthcare provider as directed:  Write down your questions so you remember to ask them during your visits  © 2017 [employer] Information is for End User's use only and may not be sold, redistributed or otherwise used for commercial purposes  All illustrations and images included in CareNotes® are the copyrighted property of [employer]  or [employer]  The above information is an  only  It is not intended as medical advice for individual conditions or treatments  Talk to your doctor, nurse or pharmacist before following any medical regimen to see if it is safe and effective for you

## 2020-02-01 NOTE — TELEPHONE ENCOUNTER
I called the left a voicemail for the patient that she tested positive for both influenza a and influenza B  I sent a prescription to her pharmacy for Tamiflu 75 mg twice a day for 5 days

## 2020-02-01 NOTE — PROGRESS NOTES
[facility]        NAME: Shahrzad Rodrigues is a 30 y o  female  : 1995    MRN: [id number]  DATE: 2020  TIME: 8:47 PM    Assessment and Plan   Viral upper respiratory tract infection [J06 9]  1  Viral upper respiratory tract infection           Patient Instructions       Follow up with PCP in 3-5 days  Increase fluids, Advil 600 mg, we discussed Tamiflu in she would rather not take it for the benefits  Flu swab done  Chief Complaint     Chief Complaint   Patient presents with    flu like symptoms     fever, body aches, chills, cough started around midnight          History of Present Illness       Patient has had no recent travels  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  She had her flu shot  She also complains of increasing myalgias  URI    This is a new problem  The current episode started yesterday  The maximum temperature recorded prior to her arrival was 101 - 101 9 F  The fever has been present for less than 1 day  Associated symptoms include congestion, coughing, rhinorrhea, sneezing and a sore throat  Pertinent negatives include no abdominal pain, chest pain, diarrhea, dysuria, ear pain, headaches, joint pain, joint swelling, nausea, neck pain, plugged ear sensation, rash, sinus pain, swollen glands, vomiting or wheezing  She has tried NSAIDs and increased fluids for the symptoms  The treatment provided mild relief  Review of Systems   Review of Systems   HENT: Positive for congestion, rhinorrhea, sneezing and sore throat  Negative for ear pain and sinus pain  Respiratory: Positive for cough  Negative for wheezing  Cardiovascular: Negative for chest pain  Gastrointestinal: Negative for abdominal pain, diarrhea, nausea and vomiting  Genitourinary: Negative for dysuria  Musculoskeletal: Negative for joint pain and neck pain  Skin: Negative for rash  Neurological: Negative for headaches     All other systems reviewed and are negative  Current Medications       Current Outpatient Medications:     ALPRAZolam (XANAX) 0 25 mg tablet, Take 1 tablet (0 25 mg total) by mouth daily as needed for anxiety, Disp: 15 tablet, Rfl: 3    diphenhydrAMINE (BENADRYL) 25 mg capsule, Take 25 mg by mouth every 6 (six) hours as needed for itching, Disp: , Rfl:     ibuprofen (MOTRIN) 200 mg tablet, Take 3 tablets by mouth every 6 (six) hours, Disp: , Rfl:     multivitamin (THERAGRAN) TABS, Take 1 tablet by mouth daily, Disp: , Rfl:     sertraline (ZOLOFT) 100 mg tablet, Take 1 tablet (100 mg total) by mouth daily, Disp: 30 tablet, Rfl: 2    Current Allergies     Allergies as of 01/31/2020    (No Known Allergies)            The following portions of the patient's history were reviewed and updated as appropriate: allergies, current medications, past family history, past medical history, past social history, past surgical history and problem list      Past Medical History:   Diagnosis Date    Anxiety     Depression     No known health problems     Denial of any significant medical history       Past Surgical History:   Procedure Laterality Date    MOUTH SURGERY      Tooth Extraction       Family History   Problem Relation Age of Onset    Hypertension Mother     Hypertension Father     Diabetes Maternal Grandfather         Diabetes Mellitus    Colon cancer Paternal Grandmother     Diabetes Paternal Grandmother         Diabetes Mellitus    Diabetes Paternal Grandfather         Diabetes Mellitus         Medications have been verified  Objective   /64   Pulse 77   Temp (!) 97 2 °F (36 2 °C)   Resp 18   Ht 5' 7 5" (1 715 m)   Wt 72 6 kg (160 lb)   SpO2 100%   BMI 24 69 kg/m²        Physical Exam     Physical Exam   Constitutional: She appears well-developed and well-nourished  HENT:   Right Ear: Hearing, external ear and ear canal normal  Tympanic membrane is bulging     Left Ear: Hearing, external ear and ear canal normal  Tympanic membrane is bulging  Nose: Mucosal edema and rhinorrhea present  Mouth/Throat: Posterior oropharyngeal erythema present  Cardiovascular: Normal rate, regular rhythm and normal heart sounds  Pulmonary/Chest: Effort normal and breath sounds normal    Lymphadenopathy:     She has no cervical adenopathy  Nursing note and vitals reviewed

## 2020-02-05 DIAGNOSIS — B96.89 ACUTE BACTERIAL SINUSITIS: Primary | ICD-10-CM

## 2020-02-05 DIAGNOSIS — J01.90 ACUTE BACTERIAL SINUSITIS: Primary | ICD-10-CM

## 2020-02-05 RX ORDER — AMOXICILLIN AND CLAVULANATE POTASSIUM 875; 125 MG/1; MG/1
1 TABLET, FILM COATED ORAL EVERY 12 HOURS SCHEDULED
Qty: 14 TABLET | Refills: 0 | Status: SHIPPED | OUTPATIENT
Start: 2020-02-05 | End: 2020-02-12

## 2020-02-20 DIAGNOSIS — F42.9 OBSESSIVE-COMPULSIVE DISORDER, UNSPECIFIED TYPE: ICD-10-CM

## 2020-02-20 DIAGNOSIS — F41.9 ANXIETY: ICD-10-CM

## 2020-02-20 RX ORDER — SERTRALINE HYDROCHLORIDE 100 MG/1
100 TABLET, FILM COATED ORAL DAILY
Qty: 30 TABLET | Refills: 0 | Status: CANCELLED | OUTPATIENT
Start: 2020-02-20

## 2020-02-21 DIAGNOSIS — F42.9 OBSESSIVE-COMPULSIVE DISORDER, UNSPECIFIED TYPE: ICD-10-CM

## 2020-02-21 NOTE — TELEPHONE ENCOUNTER
Pt request refill for her zoloft  No follow up appt is on file  Last seen in December by Dr Cheng Jo for a sick visit and September for a check up  Advised pt to call our office to set up follow up

## 2020-02-24 RX ORDER — SERTRALINE HYDROCHLORIDE 100 MG/1
100 TABLET, FILM COATED ORAL DAILY
Qty: 90 TABLET | Refills: 1 | OUTPATIENT
Start: 2020-02-24

## 2020-02-24 RX ORDER — SERTRALINE HYDROCHLORIDE 100 MG/1
100 TABLET, FILM COATED ORAL DAILY
Qty: 30 TABLET | Refills: 2 | Status: SHIPPED | OUTPATIENT
Start: 2020-02-24

## 2020-02-26 RX ORDER — ALPRAZOLAM 0.25 MG/1
0.25 TABLET ORAL DAILY PRN
Qty: 15 TABLET | Refills: 0 | Status: SHIPPED | OUTPATIENT
Start: 2020-02-26

## 2020-02-26 NOTE — TELEPHONE ENCOUNTER
Can you please contact the pt that once a year check up will not be enough from my stand point since she is on zoloft and xanax, Dr Betty Lisa saw her in Sept, I would like to see her sometime soon to establish care

## 2020-06-03 ENCOUNTER — TELEPHONE (OUTPATIENT)
Dept: INTERNAL MEDICINE CLINIC | Facility: CLINIC | Age: 25
End: 2020-06-03

## 2020-06-03 NOTE — TELEPHONE ENCOUNTER
I left a message for Reanna Richardson to call the office to schedule a sooner follow up to establish care with Dr Johnny Gross

## 2020-06-09 DIAGNOSIS — F42.9 OBSESSIVE-COMPULSIVE DISORDER, UNSPECIFIED TYPE: ICD-10-CM

## 2020-06-09 RX ORDER — SERTRALINE HYDROCHLORIDE 100 MG/1
TABLET, FILM COATED ORAL
Qty: 30 TABLET | Refills: 2 | OUTPATIENT
Start: 2020-06-09